# Patient Record
Sex: FEMALE | Race: WHITE | Employment: FULL TIME | URBAN - NONMETROPOLITAN AREA
[De-identification: names, ages, dates, MRNs, and addresses within clinical notes are randomized per-mention and may not be internally consistent; named-entity substitution may affect disease eponyms.]

---

## 2017-03-31 ENCOUNTER — HOSPITAL ENCOUNTER (OUTPATIENT)
Dept: GENERAL RADIOLOGY | Age: 57
Discharge: HOME | End: 2017-03-31
Attending: FAMILY MEDICINE | Admitting: FAMILY MEDICINE

## 2017-03-31 DIAGNOSIS — M54.5 LOW BACK PAIN, UNSPECIFIED BACK PAIN LATERALITY, UNSPECIFIED CHRONICITY, WITH SCIATICA PRESENCE UNSPECIFIED: ICD-10-CM

## 2018-06-18 ENCOUNTER — HOSPITAL ENCOUNTER (OUTPATIENT)
Dept: PHYSICAL THERAPY | Age: 58
Discharge: HOME | End: 2018-06-30

## 2018-06-18 ASSESSMENT — PAIN DESCRIPTION - PAIN TYPE: TYPE: CHRONIC PAIN

## 2018-06-18 NOTE — PLAN OF CARE
knee and rarely to the leg. She has limited L HS mobility, decreasded lumbar flexion, minor decreased extension. LLE symptoms worsened with flexion and centralized with extension. Dermatomes intact hip to ankle, but some decrease at L lateral leg. Myotomes intact. Plan of Care/Treatment to date:  [x] Therapeutic Exercise    [] Aquatics:  [x] Therapeutic Activity    [] Ultrasound  [] Elec Stimulation  [] Gait Training     [] Cervical Traction [] Lumbar Traction  [x] Neuromuscular Re-education [] Cold/hotpack [] Iontophoresis   [x] Instruction in HEP       [x] Manual Therapy     [] vasopneumatic            [x] Self care home management        []Dry needling trigger point point/pain management    ? Frequency/Duration:  # Days per week: [x] 1 day # Weeks: [] 1 week [x] 5 weeks     [x] 2 days? [] 2 weeks [] 6 weeks     [] 3 days   [] 3 weeks [] 7 weeks     [] 4 days   [] 4 weeks [] 8 weeks    Rehab Potential/Progress: [] Excellent [x] Good [] Fair  [] Poor     Goals:       Long term goals  Time Frame for Long term goals : 5weeks  Long term goal 1: I in home progam.  Long term goal 2: stand/walk 1 hr with min symptoms  Long term goal 3: sit one hour with minimal symptoms  Long term goal 4: sleep without waking due to pain 3/7 nights  Long term goal 5: derease LLE by 75% or more. Electronically signed by:  Jakob Miller PT, 6/18/2018, 10:48 AM              If you have any questions or concerns, please don't hesitate to call.   Thank you for your referral.      Physician Signature:_________________Date:____________Time: ________  By signing above, therapists plan is approved by physician

## 2018-06-18 NOTE — FLOWSHEET NOTE
Outpatient Physical Therapy           [place]           [] Phone: [phone]   Fax: [phone]  [place]           [] Phone: [phone]   Fax: [phone]    Physical Therapy Daily Treatment Note  Date:  2018    Patient Name:  Sangita Lancaster    :  1960  MRN: [id number]  Restrictions/Precautions:   Diagnosis:   Diagnosis: M54.5 s/p L5/S1 Lfd 18  Date of Surgery: 2018  Treatment Diagnosis: Treatment Diagnosis: lumbar radiculopathy    Insurance/Certification information:    Referring Physician:  Referring Practitioner: Dr. Maritza Baird  Next Doctor Visit:    Plan of care signed (Y/N):  n  Visit# / total visits: 1 /3 initially  Pain level: /10   Goals:          Long term goals  Time Frame for Long term goals : 5weeks  Long term goal 1: I in home progam.  Long term goal 2: stand/walk 1 hr with min symptoms  Long term goal 3: sit one hour with minimal symptoms  Long term goal 4: sleep without waking due to pain 3/7 nights  Long term goal 5: derease LLE by 75% or more. Subjective:  Pt relates a multi year history of low back and LLE symptoms. She had laser surgery in 2018 with good results, but LLE symptoms returned in 2018. She relates symptoms in her LLE are constant and variable, usually extending to the knee and rarely to the leg. She has limited L HS mobility, decreasded lumbar flexion, minor decreased extension. LLE symptoms worsened with flexion and centralized with extension. Dermatomes intact hip to ankle, but some decrease at L lateral leg. Myotomes intact.        Any changes in Ambulatory Summary Sheet? n      Objective:          Exercises:  Exercise/Equipment Date 18 Date Date Date     Prone prop 2 x 3 mins        Prone press up 3x10        Standing back extension 3x10        clamshell         bridge         Hip 4 way        lat pull down          Standing rows        standing  Isometric belly press - paloff pess          Body blade 2 handed trunk

## 2018-06-18 NOTE — PROGRESS NOTES
Physical Therapy  Initial Assessment  Date: 2018  Patient Name: George Norton  MRN: [id number]  : 1960     Treatment Diagnosis: lumbar radiculopathy    Restrictions       Subjective   General  Additional Pertinent Hx: Surgery 2018, pain in LLE returned 2018. pain extends to knee, rarely to leg. Family / Caregiver Present: No  Referring Practitioner: Dr. Juwan Carvajal  Referral Date : 18  Diagnosis: M54.5 s/p L5/S1 Lfd 18  PT Visit Information  PT Insurance Information: [employer]  Subjective  Subjective: complains of LLE pain to knee, rarely to leg.  limited sitting to 1hr, standing/walking 30mins. Sleep interrupted every 2-3hrs. Pain Screening  Patient Currently in Pain: Yes  Pain Assessment  Pain Assessment: 0-10  Pain Type: Chronic pain  Vital Signs  Patient Currently in Pain: Yes    Vision/Hearing  Vision  Vision: Within Functional Limits  Hearing  Hearing: Within functional limits    Orientation  Orientation  Overall Orientation Status: Within Normal Limits    Social/Functional History  Social/Functional History  Type of Home: House  Home Layout: One level  ADL Assistance: Independent  Homemaking Assistance: Independent  Homemaking Responsibilities: Yes  Ambulation Assistance: Independent  Transfer Assistance: Independent  Active : Yes  Mode of Transportation: Car  Occupation: Retired  Objective          AROM RLE (degrees)  RLE AROM: WFL  AROM LLE (degrees)  LLE AROM : WFL  Spine  Lumbar: extension 10, flexion to proximal leg, BSB wfls    Strength RLE  Comment: myotomes intact  Strength LLE  Comment: myotomes intact     Additional Measures  Special Tests: SLR limited L HS.  minor improvement with standing extension, worse with standing flexion. prone press centralized symptoms.    Sensation  Overall Sensation Status:  (dermatomes intact thighs to ankles, but decrease Lat L leg.)           Assessment   Conditions Requiring Skilled Therapeutic Intervention  Body structures, Functions, Activity limitations: Decreased functional mobility ; Decreased ROM; Decreased high-level IADLs  Assessment: Pt relates a multi year history of low back and LLE symptoms. She had laser surgery in January 2018 with good results, but LLE symptoms returned in 4/2018. She relates symptoms in her LLE are constant and variable, usually extending to the knee and rarely to the leg. She has limited L HS mobility, decreasded lumbar flexion, minor decreased extension. LLE symptoms worsened with flexion and centralized with extension. Dermatomes intact hip to ankle, but some decrease at L lateral leg. Myotomes intact. Treatment Diagnosis: lumbar radiculopathy  Prognosis: Good  REQUIRES PT FOLLOW UP: Yes         Plan   Plan  Times per week: one time a week initially, may increase to 2x pending progress     G-Code  PT G-Codes  Functional Assessment Tool Used: Oswestry  Score: 50%  Functional Limitation: Mobility: Walking and moving around  Mobility: Walking and Moving Around Current Status (): At least 40 percent but less than 60 percent impaired, limited or restricted  Mobility: Walking and Moving Around Goal Status (): At least 20 percent but less than 40 percent impaired, limited or restricted    OutComes Score                                           Goals  Long term goals  Time Frame for Long term goals : 5weeks  Long term goal 1: I in home progam.  Long term goal 2: stand/walk 1 hr with min symptoms  Long term goal 3: sit one hour with minimal symptoms  Long term goal 4: sleep without waking due to pain 3/7 nights  Long term goal 5: derease LLE by 75% or more.    Patient Goals   Patient goals : decrease pain, stand, walk, sit, sleep          Roberto Martinez, PT

## 2018-07-01 ENCOUNTER — HOSPITAL ENCOUNTER (OUTPATIENT)
Dept: PHYSICAL THERAPY | Age: 58
Discharge: HOME | End: 2018-07-31

## 2018-08-01 ENCOUNTER — HOSPITAL ENCOUNTER (OUTPATIENT)
Dept: PHYSICAL THERAPY | Age: 58
Discharge: HOME | End: 2018-08-31

## 2018-08-01 PROBLEM — N17.9 ACUTE KIDNEY INJURY (HCC): Status: ACTIVE | Noted: 2018-08-01

## 2018-08-01 PROBLEM — E78.5 HYPERLIPIDEMIA: Status: ACTIVE | Noted: 2018-08-01

## 2018-08-01 PROBLEM — I10 HTN (HYPERTENSION): Status: ACTIVE | Noted: 2018-08-01

## 2018-08-01 PROBLEM — M54.9 BACK PAIN: Status: ACTIVE | Noted: 2018-08-01

## 2018-09-01 ENCOUNTER — HOSPITAL ENCOUNTER (OUTPATIENT)
Dept: PHYSICAL THERAPY | Age: 58
Discharge: HOME | End: 2018-09-14

## 2018-09-12 PROBLEM — N28.89 RENAL MASS: Status: ACTIVE | Noted: 2018-09-12

## 2019-06-12 PROBLEM — N18.31 CHRONIC KIDNEY DISEASE (CKD) STAGE G3A/A1, MODERATELY DECREASED GLOMERULAR FILTRATION RATE (GFR) BETWEEN 45-59 ML/MIN/1.73 SQUARE METER AND ALBUMINURIA CREATININE RATIO LESS THAN 30 MG/G (HCC): Status: ACTIVE | Noted: 2019-06-12

## 2020-03-25 PROBLEM — M54.9 BACK PAIN: Status: RESOLVED | Noted: 2018-08-01 | Resolved: 2020-03-24

## 2021-06-07 ENCOUNTER — HOSPITAL ENCOUNTER (OUTPATIENT)
Dept: ULTRASOUND IMAGING | Age: 61
Discharge: HOME | End: 2021-06-07
Payer: COMMERCIAL

## 2021-06-07 DIAGNOSIS — N18.30 CHRONIC KIDNEY DISEASE, STAGE III (MODERATE) (HCC): ICD-10-CM

## 2021-06-07 PROCEDURE — 76775 US EXAM ABDO BACK WALL LIM: CPT

## 2025-07-20 RX ORDER — NICOTINE POLACRILEX 2 MG
GUM BUCCAL
COMMUNITY